# Patient Record
Sex: MALE | Race: WHITE | Employment: FULL TIME | URBAN - METROPOLITAN AREA
[De-identification: names, ages, dates, MRNs, and addresses within clinical notes are randomized per-mention and may not be internally consistent; named-entity substitution may affect disease eponyms.]

---

## 2017-05-08 ENCOUNTER — OFFICE VISIT (OUTPATIENT)
Dept: FAMILY MEDICINE CLINIC | Facility: CLINIC | Age: 37
End: 2017-05-08

## 2017-05-08 ENCOUNTER — APPOINTMENT (OUTPATIENT)
Dept: LAB | Age: 37
End: 2017-05-08
Attending: FAMILY MEDICINE
Payer: COMMERCIAL

## 2017-05-08 ENCOUNTER — LAB ENCOUNTER (OUTPATIENT)
Dept: LAB | Age: 37
End: 2017-05-08
Attending: FAMILY MEDICINE
Payer: COMMERCIAL

## 2017-05-08 VITALS
TEMPERATURE: 99 F | HEART RATE: 92 BPM | SYSTOLIC BLOOD PRESSURE: 124 MMHG | RESPIRATION RATE: 14 BRPM | BODY MASS INDEX: 30.86 KG/M2 | WEIGHT: 208.38 LBS | HEIGHT: 69 IN | DIASTOLIC BLOOD PRESSURE: 100 MMHG

## 2017-05-08 DIAGNOSIS — Z00.00 ADULT GENERAL MEDICAL EXAM: ICD-10-CM

## 2017-05-08 DIAGNOSIS — R03.0 ELEVATED BLOOD PRESSURE READING: ICD-10-CM

## 2017-05-08 DIAGNOSIS — M21.41 PES PLANUS OF BOTH FEET: ICD-10-CM

## 2017-05-08 DIAGNOSIS — Z72.0 TOBACCO USE: ICD-10-CM

## 2017-05-08 DIAGNOSIS — S39.012A LUMBAR STRAIN, INITIAL ENCOUNTER: ICD-10-CM

## 2017-05-08 DIAGNOSIS — M22.2X1 PATELLOFEMORAL PAIN SYNDROME OF RIGHT KNEE: ICD-10-CM

## 2017-05-08 DIAGNOSIS — M21.42 PES PLANUS OF BOTH FEET: ICD-10-CM

## 2017-05-08 DIAGNOSIS — Z00.00 ADULT GENERAL MEDICAL EXAM: Primary | ICD-10-CM

## 2017-05-08 PROCEDURE — 99213 OFFICE O/P EST LOW 20 MIN: CPT | Performed by: FAMILY MEDICINE

## 2017-05-08 PROCEDURE — 36415 COLL VENOUS BLD VENIPUNCTURE: CPT

## 2017-05-08 PROCEDURE — 93005 ELECTROCARDIOGRAM TRACING: CPT

## 2017-05-08 PROCEDURE — 84443 ASSAY THYROID STIM HORMONE: CPT

## 2017-05-08 PROCEDURE — 93010 ELECTROCARDIOGRAM REPORT: CPT | Performed by: FAMILY MEDICINE

## 2017-05-08 PROCEDURE — 85025 COMPLETE CBC W/AUTO DIFF WBC: CPT

## 2017-05-08 PROCEDURE — 80053 COMPREHEN METABOLIC PANEL: CPT

## 2017-05-08 PROCEDURE — 99395 PREV VISIT EST AGE 18-39: CPT | Performed by: FAMILY MEDICINE

## 2017-05-08 PROCEDURE — 80061 LIPID PANEL: CPT

## 2017-05-08 RX ORDER — MELOXICAM 15 MG/1
15 TABLET ORAL DAILY
Qty: 30 TABLET | Refills: 1 | Status: SHIPPED | OUTPATIENT
Start: 2017-05-08 | End: 2017-06-07

## 2017-05-08 RX ORDER — OMEPRAZOLE 20 MG/1
20 TABLET, DELAYED RELEASE ORAL DAILY
COMMUNITY
End: 2021-05-22

## 2017-05-08 NOTE — PATIENT INSTRUCTIONS
May want to get some arch supports for both shoes. Dr. Ramirez Cue her good brand but can also go to a store called [employer]. Go to [employer] for OTC arch supports and bring your gym shoes with you.

## 2017-05-08 NOTE — PROGRESS NOTES
Patient ID: Laura Rosales is a 30year old male. HPI  Patient presents with:  Routine Physical    He never did do his labs. He is . He still smokes one half pack per day. He tried the gum and the patch and E cigarettes.   He states Chantix m distress. HENT:   Head: Normocephalic.    Right Ear: Tympanic membrane and ear canal normal.   Left Ear: Tympanic membrane and ear canal normal.   Mouth/Throat: Oropharynx is clear and moist and mucous membranes are normal.   Eyes: Conjunctivae and EOM ar Patellar apprehension Negative   Patellofemoral pain Positive       Medial facet pain +       Lateral facet pain +   Patellofemoral Crepitation Positive   Hip Motion Normal   Gait Normal       BACK: Has tenderness over the lumbar paraspinal muscles from (pain/inflammation). Follow up if symptoms persist.  Take medicine (if given) as prescribed. Approach to treatment discussed and patient/family member understands and agrees to plan. Karla Vasquez, DO  5/8/2017      .

## 2017-05-19 ENCOUNTER — TELEPHONE (OUTPATIENT)
Dept: FAMILY MEDICINE CLINIC | Facility: CLINIC | Age: 37
End: 2017-05-19

## 2017-05-19 DIAGNOSIS — E78.00 HYPERCHOLESTEREMIA: Primary | ICD-10-CM

## 2017-05-19 NOTE — TELEPHONE ENCOUNTER
----- Message from Terri Ovalle DO sent at 5/13/2017  4:53 PM CDT -----  CBC shows no anemia, CMP shows your sugar, kidney function and liver function are normal,  and TSH for your thyroid hormones are normal.  Lipids show total cholesterol, triglyceride

## 2017-05-19 NOTE — TELEPHONE ENCOUNTER
Verified name and . Results/recommendations reviewed with pt and pt verb understanding. Orders placed for 2017 Lipid Panel.

## 2017-05-19 NOTE — PROGRESS NOTES
Quick Note:    Verified name and .  Results/recommendations reviewed with pt and pt verb understanding  ______

## 2017-08-25 ENCOUNTER — NURSE TRIAGE (OUTPATIENT)
Dept: OTHER | Age: 37
End: 2017-08-25

## 2017-08-29 ENCOUNTER — LAB ENCOUNTER (OUTPATIENT)
Dept: LAB | Age: 37
End: 2017-08-29
Attending: FAMILY MEDICINE
Payer: COMMERCIAL

## 2017-08-29 DIAGNOSIS — R46.89 COGNITIVE AND BEHAVIORAL CHANGES: ICD-10-CM

## 2017-08-29 DIAGNOSIS — R41.3 MEMORY LOSS OF UNKNOWN CAUSE: ICD-10-CM

## 2017-08-29 DIAGNOSIS — R41.89 COGNITIVE AND BEHAVIORAL CHANGES: ICD-10-CM

## 2017-08-29 LAB
ALBUMIN SERPL BCP-MCNC: 4.4 G/DL (ref 3.5–4.8)
ALBUMIN/GLOB SERPL: 1.8 {RATIO} (ref 1–2)
ALP SERPL-CCNC: 55 U/L (ref 32–100)
ALT SERPL-CCNC: 63 U/L (ref 17–63)
ANION GAP SERPL CALC-SCNC: 8 MMOL/L (ref 0–18)
AST SERPL-CCNC: 37 U/L (ref 15–41)
BASOPHILS # BLD: 0.1 K/UL (ref 0–0.2)
BASOPHILS NFR BLD: 1 %
BILIRUB SERPL-MCNC: 0.5 MG/DL (ref 0.3–1.2)
BUN SERPL-MCNC: 19 MG/DL (ref 8–20)
BUN/CREAT SERPL: 20.2 (ref 10–20)
CALCIUM SERPL-MCNC: 9.8 MG/DL (ref 8.5–10.5)
CHLORIDE SERPL-SCNC: 104 MMOL/L (ref 95–110)
CO2 SERPL-SCNC: 27 MMOL/L (ref 22–32)
CREAT SERPL-MCNC: 0.94 MG/DL (ref 0.5–1.5)
EOSINOPHIL # BLD: 0.2 K/UL (ref 0–0.7)
EOSINOPHIL NFR BLD: 3 %
ERYTHROCYTE [DISTWIDTH] IN BLOOD BY AUTOMATED COUNT: 12 % (ref 11–15)
GLOBULIN PLAS-MCNC: 2.5 G/DL (ref 2.5–3.7)
GLUCOSE SERPL-MCNC: 102 MG/DL (ref 70–99)
HCT VFR BLD AUTO: 41.1 % (ref 41–52)
HGB BLD-MCNC: 13.9 G/DL (ref 13.5–17.5)
LYMPHOCYTES # BLD: 2.1 K/UL (ref 1–4)
LYMPHOCYTES NFR BLD: 24 %
MCH RBC QN AUTO: 31.2 PG (ref 27–32)
MCHC RBC AUTO-ENTMCNC: 33.7 G/DL (ref 32–37)
MCV RBC AUTO: 92.6 FL (ref 80–100)
MONOCYTES # BLD: 0.8 K/UL (ref 0–1)
MONOCYTES NFR BLD: 9 %
NEUTROPHILS # BLD AUTO: 5.7 K/UL (ref 1.8–7.7)
NEUTROPHILS NFR BLD: 64 %
OSMOLALITY UR CALC.SUM OF ELEC: 290 MOSM/KG (ref 275–295)
PLATELET # BLD AUTO: 262 K/UL (ref 140–400)
PMV BLD AUTO: 7.5 FL (ref 7.4–10.3)
POTASSIUM SERPL-SCNC: 4.2 MMOL/L (ref 3.3–5.1)
PROT SERPL-MCNC: 6.9 G/DL (ref 5.9–8.4)
RBC # BLD AUTO: 4.44 M/UL (ref 4.5–5.9)
SODIUM SERPL-SCNC: 139 MMOL/L (ref 136–144)
VIT B12 SERPL-MCNC: 341 PG/ML (ref 181–914)
WBC # BLD AUTO: 8.8 K/UL (ref 4–11)

## 2017-08-29 PROCEDURE — 82607 VITAMIN B-12: CPT

## 2017-08-29 PROCEDURE — 36415 COLL VENOUS BLD VENIPUNCTURE: CPT

## 2017-08-29 PROCEDURE — 80053 COMPREHEN METABOLIC PANEL: CPT

## 2017-08-29 PROCEDURE — 85025 COMPLETE CBC W/AUTO DIFF WBC: CPT

## 2017-08-29 NOTE — PROGRESS NOTES
Patient ID: Genaro Ford is a 30year old male.     HPI  Patient presents with:  Dizziness    Action Requested: Summary for Provider     []  Critical Lab, Recommendations Needed  [] Need Additional Advice  [x]   FYI    []   Need Orders  [] Need [employer]. He does not do illicit drugs. He does not drink alcohol every night. He states he has been stressed and overwhelmed as his wife is also having her first child and work has been stressful.         Wt Readings from Last 6 Encounters:  08/29/17 Patient is alert and oriented to person, place, and time   Patient has normal strength and normal reflexes. Patient displays no tremor. No cranial nerve deficit. Coordination and gait normal   Skin: Skin is warm.    Psychiatric: Patient has a normal mood

## 2022-01-26 ENCOUNTER — LAB ENCOUNTER (OUTPATIENT)
Dept: LAB | Age: 42
End: 2022-01-26
Attending: DERMATOLOGY
Payer: COMMERCIAL

## 2022-01-26 DIAGNOSIS — D48.5 NEOPLASM OF UNCERTAIN BEHAVIOR OF SKIN: Primary | ICD-10-CM

## 2022-01-26 PROCEDURE — 88342 IMHCHEM/IMCYTCHM 1ST ANTB: CPT

## 2022-01-26 PROCEDURE — 88341 IMHCHEM/IMCYTCHM EA ADD ANTB: CPT

## 2022-01-28 ENCOUNTER — LAB ENCOUNTER (OUTPATIENT)
Dept: LAB | Age: 42
End: 2022-01-28
Attending: DERMATOLOGY
Payer: COMMERCIAL

## 2022-01-28 DIAGNOSIS — D48.5 NEOPLASM OF UNCERTAIN BEHAVIOR OF SKIN: ICD-10-CM

## 2022-03-21 PROBLEM — C44.599: Status: ACTIVE | Noted: 2022-03-21

## 2022-05-19 NOTE — TELEPHONE ENCOUNTER
Action Requested: Summary for Provider     []  Critical Lab, Recommendations Needed  [] Need Additional Advice  [x]   FYI    []   Need Orders  [] Need Medications Sent to Pharmacy  []  Other     SUMMARY:  Pt given appointment Tuesday, says he is not availa Problem: Patient Centered Care  Goal: Patient preferences are identified and integrated in the patient's plan of care  Description: Interventions:  - What would you like us to know as we care for you? ***  - Provide timely, complete, and accurate information to patient/family  - Incorporate patient and family knowledge, values, beliefs, and cultural backgrounds into the planning and delivery of care  - Encourage patient/family to participate in care and decision-making at the level they choose  - Honor patient and family perspectives and choices  Outcome: Progressing     Problem: Patient/Family Goals  Goal: Patient/Family Long Term Goal  Description: Patient's Long Term Goal: ***    Interventions:  - ***  - See additional Care Plan goals for specific interventions  Outcome: Progressing  Goal: Patient/Family Short Term Goal  Description: Patient's Short Term Goal: ***    Interventions:   - ***  - See additional Care Plan goals for specific interventions  Outcome: Progressing     Problem: PAIN - ADULT  Goal: Verbalizes/displays adequate comfort level or patient's stated pain goal  Description: INTERVENTIONS:  - Encourage pt to monitor pain and request assistance  - Assess pain using appropriate pain scale  - Administer analgesics based on type and severity of pain and evaluate response  - Implement non-pharmacological measures as appropriate and evaluate response  - Consider cultural and social influences on pain and pain management  - Manage/alleviate anxiety  - Utilize distraction and/or relaxation techniques  - Monitor for opioid side effects  - Notify MD/LIP if interventions unsuccessful or patient reports new pain  - Anticipate increased pain with activity and pre-medicate as appropriate  Outcome: Progressing     Problem: RISK FOR INFECTION - ADULT  Goal: Absence of fever/infection during anticipated neutropenic period  Description: INTERVENTIONS  - Monitor WBC  - Administer growth factors as ordered  - Implement neutropenic guidelines  Outcome: Progressing     Problem: SAFETY ADULT - FALL  Goal: Free from fall injury  Description: INTERVENTIONS:  - Assess pt frequently for physical needs  - Identify cognitive and physical deficits and behaviors that affect risk of falls.   - [place] fall precautions as indicated by assessment.  - Educate pt/family on patient safety including physical limitations  - Instruct pt to call for assistance with activity based on assessment  - Modify environment to reduce risk of injury  - Provide assistive devices as appropriate  - Consider OT/PT consult to assist with strengthening/mobility  - Encourage toileting schedule  Outcome: Progressing     Problem: DISCHARGE PLANNING  Goal: Discharge to home or other facility with appropriate resources  Description: INTERVENTIONS:  - Identify barriers to discharge w/pt and caregiver  - Include patient/family/discharge partner in discharge planning  - Arrange for needed discharge resources and transportation as appropriate  - Identify discharge learning needs (meds, wound care, etc)  - Arrange for interpreters to assist at discharge as needed  - Consider post-discharge preferences of patient/family/discharge partner  - Complete POLST form as appropriate  - Assess patient's ability to be responsible for managing their own health  - Refer to Case Management Department for coordinating discharge planning if the patient needs post-hospital services based on physician/LIP order or complex needs related to functional status, cognitive ability or social support system  Outcome: Progressing     Problem: Altered Communication/Language Barrier  Goal: Patient/Family is able to understand and participate in their care  Description: Interventions:  - Assess communication ability and preferred communication style  - Implement communication aides and strategies  - Use visual cues when possible  - Listen attentively, be patient, do not interrupt  - Minimize distractions  - Allow time for understanding and response  - Establish method for patient to ask for assistance (call light)  - Provide an  as needed  - Communicate barriers and strategies to overcome with those who interact with patient  Outcome: Progressing

## 2022-08-24 ENCOUNTER — TELEPHONE (OUTPATIENT)
Dept: PAIN CLINIC | Facility: HOSPITAL | Age: 42
End: 2022-08-24

## 2022-08-24 NOTE — TELEPHONE ENCOUNTER
Returned patient voicemail left on 8/24/22 at 9:23am regarding new consult appt. Advised patient that we did receive MRI report and referral from Ortho but are still waiting on most recent office visit notes. Patient stated he will contact ortho and have office fax to us. Paperwork in [place] Consult bin in Pain office.

## 2022-08-29 DIAGNOSIS — M54.16 LUMBAR RADICULOPATHY: Primary | ICD-10-CM

## 2022-09-21 ENCOUNTER — TELEPHONE (OUTPATIENT)
Dept: NEUROLOGY | Facility: CLINIC | Age: 42
End: 2022-09-21

## 2022-09-21 ENCOUNTER — OFFICE VISIT (OUTPATIENT)
Dept: PAIN CLINIC | Facility: HOSPITAL | Age: 42
End: 2022-09-21
Attending: ORTHOPAEDIC SURGERY

## 2022-09-21 VITALS
BODY MASS INDEX: 31.07 KG/M2 | SYSTOLIC BLOOD PRESSURE: 133 MMHG | RESPIRATION RATE: 18 BRPM | HEIGHT: 68 IN | WEIGHT: 205 LBS | HEART RATE: 88 BPM | DIASTOLIC BLOOD PRESSURE: 89 MMHG

## 2022-09-21 DIAGNOSIS — M54.16 LUMBAR RADICULOPATHY: ICD-10-CM

## 2022-09-21 PROCEDURE — 99202 OFFICE O/P NEW SF 15 MIN: CPT

## 2022-09-21 RX ORDER — OMEPRAZOLE 20 MG/1
TABLET, DELAYED RELEASE ORAL AS DIRECTED
COMMUNITY

## 2022-09-21 RX ORDER — PREGABALIN 75 MG/1
75 CAPSULE ORAL 2 TIMES DAILY
Qty: 60 CAPSULE | Refills: 2 | Status: SHIPPED | OUTPATIENT
Start: 2022-09-21 | End: 2022-10-21

## 2022-09-21 NOTE — TELEPHONE ENCOUNTER
Right L3-L4, L4-L5 TFESI CPT CODE: 84753 Dx M54.16      Status: Pre authorization is not required    Availity online for authorization of approval for above. Notification or Prior Authorization is not required for the requested services. Reference Number: [id number]    Notified MAK Lai  for scheduling

## 2022-10-14 ENCOUNTER — SURGERY CENTER DOCUMENTATION (OUTPATIENT)
Dept: SURGERY | Age: 42
End: 2022-10-14

## 2022-10-14 NOTE — PROCEDURES
[facility] [address]            Patient: Ghazala Cameron  : 3/28/1980        Operative Report        Date of procedure: 10/14/2022    Preoperative diagnosis: lumbar radiculopathy   Postop diagnosis: lumbar radiculopathy       Procedure:    Transforaminal epidural steroid injection fluoroscopic guidance and possible contrast image saved    Surgeon: Kelly Rodgers MD      Operative procedure: The patient was brought to the local room suite and placed in the prone position with a pillow under the lower pelvis. Patient was prepped and draped in normal sterile fashion. The endplates of the vertebral bodies were aligned. Fluoroscopic beam was rotated ipsilateral to align the superior articulating process of the vertebra below with the 6 o'clock position of the above pedicle. Xylocaine 1% was instilled into the skin and subcutaneous tissue over the RIGHT L4/L5 level at which point a 22-gauge, 5-1/2 inch spinal needle was introduced and passed under direct fluoroscopic guidance to the bone of the pedicle at the 6 o'clock position. The needle was then redirected inferiorly to the anterior portion of the foramen. There was no CSF, paresthesia, or blood noted. After negative aspiration a solution of Omnipaque 240 contrast media, 1mL was instilled which showed flow into the epidural space as well as an outline of the nerve root tracking distally. There is no evidence of subarachnoid, subdural, or intravascular spread. This was checked in both AP and lateral views. At this point after negative aspiration, a solution of Depo-Medrol 40 mg and 1.5 mL's of preservative-free normal saline was instilled. The needle was withdrawn. Xylocaine 1% was instilled into the skin and subcutaneous tissue over the RIGHT L3/L4 level at which point a 22-gauge, 5-1/2 inch spinal needle was introduced and passed under direct fluoroscopic guidance to the bone of the pedicle at the 6 o'clock position. The needle was then redirected inferiorly to the anterior portion of the foramen. There was no CSF, paresthesia, or blood noted. After negative aspiration a solution of Omnipaque 240 contrast media, 1mL was instilled which showed flow into the epidural space as well as an outline of the nerve root tracking distally. There is no evidence of subarachnoid, subdural, or intravascular spread. This was checked in both AP and lateral views. At this point after negative aspiration, a solution of Depo-Medrol 40 mg and 1.5 mL's of preservative-free normal saline was instilled. The needle was withdrawn. A Band-Aid applied. The patient tolerated procedure well without complication and was discharged home with instructions. Electronically approved by:    Shiva Morales MD

## 2022-10-27 ENCOUNTER — OFFICE VISIT (OUTPATIENT)
Dept: PAIN CLINIC | Facility: HOSPITAL | Age: 42
End: 2022-10-27
Attending: NURSE PRACTITIONER
Payer: COMMERCIAL

## 2022-10-27 VITALS — DIASTOLIC BLOOD PRESSURE: 97 MMHG | SYSTOLIC BLOOD PRESSURE: 146 MMHG | HEART RATE: 92 BPM

## 2022-10-27 DIAGNOSIS — M54.16 LUMBAR RADICULOPATHY: Primary | ICD-10-CM

## 2022-10-27 PROCEDURE — 99211 OFF/OP EST MAY X REQ PHY/QHP: CPT

## 2022-10-27 NOTE — PROGRESS NOTES
PT presents ambulatory to the C[facility].  PT states about 60-70% improvement. 2/10 back that increases with activity and at work. 6/10 tailbone pain. MIGUE Cole saw PT for R Trans L3/4, L5/S1. See notes for POC.

## 2023-03-13 ENCOUNTER — PATIENT OUTREACH (OUTPATIENT)
Dept: CASE MANAGEMENT | Age: 43
End: 2023-03-13

## 2023-03-13 NOTE — PROCEDURES
The office order for PCP removal request is Approved and finalized on March 13, 2023. PCP name was removed by the office staff - Please remind staff they should not remove PCP name at time of placing the order. Thank you!     Thanks,  [facility] [employer]

## (undated) NOTE — MR AVS SNAPSHOT
[address], [address]  [address] [facility]  [phone]               Thank you for choosing us for your health care visit with Dario Zarate DO.   We are glad to serve you and happy to provide you with this summary Frequency: 2-3 times per week. ....... Dl Rodriguez Duration: 4-6 weeks            Can take to various Physical Therapy locations as is APPROVED by your insurance.     [address] # : [phone] for therapist to send me notes      Referral Orders      Normal Orders This Vi Instructions and Information about Your Health    May want to get some arch supports for both shoes. Dr. Mary Mcfaddeniss her good brand but can also go to a store called [employer].     Go to [employer] for OTC arch supports and bring y dairy products with reduced content of saturated and total fat.    Dietary sodium reduction Reduce dietary sodium intake to <= 100 mmol per day (2.4 g sodium or 6 g sodium chloride)   Aerobic physical activity Regular aerobic physical activity (e.g., brisk Visit [facility] online at  [email or web address]

## (undated) NOTE — Clinical Note
May 11, 2017     Jorge Chaney [place] [address]      Dear Latosha Leigh:    Below are the results from your recent visit: Your recent Ekg was normal      Resulted Orders   EKG 12-LEAD    Narrative    ECG Report      Interpretation      -

## (undated) NOTE — LETTER
12/01/17        Shazia Chaney [place] [address]      Dear Polina Yang,    [facility] records indicate that you have outstanding lab work and or testing that was ordered for you and has not yet been completed:          Lipid Panel  To provide you